# Patient Record
Sex: FEMALE | Race: WHITE | NOT HISPANIC OR LATINO | URBAN - METROPOLITAN AREA
[De-identification: names, ages, dates, MRNs, and addresses within clinical notes are randomized per-mention and may not be internally consistent; named-entity substitution may affect disease eponyms.]

---

## 2017-08-28 PROBLEM — 398050005 DIVERTICULAR DISEASE OF COLON: Status: ACTIVE | Noted: 2017-08-28

## 2017-08-28 PROBLEM — 87522002 IRON DEFICIENCY ANEMIA: Status: ACTIVE | Noted: 2017-08-28

## 2020-10-05 ENCOUNTER — APPOINTMENT (RX ONLY)
Dept: URBAN - METROPOLITAN AREA CLINIC 12 | Facility: CLINIC | Age: 55
Setting detail: DERMATOLOGY
End: 2020-10-05

## 2020-10-05 DIAGNOSIS — Z44.3 ENCOUNTER FOR FITTING AND ADJUSTMENT OF EXTERNAL BREAST PROSTHESIS: ICD-10-CM

## 2020-10-05 DIAGNOSIS — Z41.1 ENCOUNTER FOR COSMETIC SURGERY: ICD-10-CM

## 2020-10-05 PROBLEM — Z44.32: Status: ACTIVE | Noted: 2020-10-05

## 2020-10-05 PROCEDURE — ? COSMETIC CONSULTATION - MICRO-NEEDLING

## 2020-10-05 PROCEDURE — ? PATIENT SPECIFIC COUNSELING

## 2020-10-05 PROCEDURE — ? CONSULTATION - BREAST (COSMETIC)

## 2020-10-05 ASSESSMENT — LOCATION SIMPLE DESCRIPTION DERM
LOCATION SIMPLE: LEFT BREAST
LOCATION SIMPLE: RIGHT ANTERIOR NECK
LOCATION SIMPLE: RIGHT ANTERIOR NECK

## 2020-10-05 ASSESSMENT — LOCATION ZONE DERM
LOCATION ZONE: NECK
LOCATION ZONE: TRUNK
LOCATION ZONE: NECK

## 2020-10-05 ASSESSMENT — LOCATION DETAILED DESCRIPTION DERM
LOCATION DETAILED: LEFT MEDIAL BREAST 6-7:00 REGION
LOCATION DETAILED: RIGHT SUPERIOR ANTERIOR NECK
LOCATION DETAILED: RIGHT SUPERIOR ANTERIOR NECK

## 2020-10-05 NOTE — PROCEDURE: CONSULTATION - BREAST (COSMETIC)
Consultation Charge $ (Use Numbers Only, No Text Please.): 125.46
Detail Level: Detailed
Send Procedure Quote As Charge: No

## 2020-10-05 NOTE — HPI: BREAST (IMPLANT RUPTURE)
Which Breast(S) Are You Concerned About?: left breast
Which Side(S)?: the left breast
How Severe Are Your Concerns?: moderate
Is This A New Presentation, Or A Follow-Up?: Breast Deformity

## 2020-11-02 ENCOUNTER — APPOINTMENT (RX ONLY)
Dept: URBAN - METROPOLITAN AREA CLINIC 12 | Facility: CLINIC | Age: 55
Setting detail: DERMATOLOGY
End: 2020-11-02

## 2020-11-02 DIAGNOSIS — Z44.3 ENCOUNTER FOR FITTING AND ADJUSTMENT OF EXTERNAL BREAST PROSTHESIS: ICD-10-CM

## 2020-11-02 PROBLEM — Z44.30 ENCOUNTER FOR FITTING AND ADJUSTMENT OF EXTERNAL BREAST PROSTHESIS, UNSPECIFIED BREAST: Status: ACTIVE | Noted: 2020-11-02

## 2020-11-02 PROCEDURE — 99024 POSTOP FOLLOW-UP VISIT: CPT

## 2020-11-02 PROCEDURE — ? PRE-OP WORKLIST

## 2020-11-02 PROCEDURE — ? PATIENT SPECIFIC COUNSELING

## 2020-11-02 PROCEDURE — ? NO CHARGE PRE-OP VISIT

## 2020-11-02 ASSESSMENT — LOCATION DETAILED DESCRIPTION DERM
LOCATION DETAILED: RIGHT PERIAREOLAR BREAST 6-7:00 REGION
LOCATION DETAILED: RIGHT MEDIAL BREAST 5-6:00 REGION
LOCATION DETAILED: LEFT PERIAREOLAR BREAST 6-7:00 REGION
LOCATION DETAILED: LEFT MEDIAL BREAST 7-8:00 REGION

## 2020-11-02 ASSESSMENT — LOCATION ZONE DERM: LOCATION ZONE: TRUNK

## 2020-11-02 ASSESSMENT — LOCATION SIMPLE DESCRIPTION DERM
LOCATION SIMPLE: LEFT BREAST
LOCATION SIMPLE: RIGHT BREAST

## 2020-11-02 NOTE — PROCEDURE: PRE-OP WORKLIST
Surgeon: Dr. Harrell
Photos Taken?: yes
Surgery Scheduled: Bilateral implant exchange with vertical lift
Detail Level: Simple
Date Of Surgery: 11/18/2020

## 2020-11-02 NOTE — PROCEDURE: NO CHARGE PRE-OP VISIT
For Tracking Purposes Only. Note 00252 Is Retired Code. Use 89530 Or 39192?: [id number]
Detail Level: Detailed

## 2020-12-23 ENCOUNTER — APPOINTMENT (RX ONLY)
Dept: URBAN - METROPOLITAN AREA CLINIC 12 | Facility: CLINIC | Age: 55
Setting detail: DERMATOLOGY
End: 2020-12-23

## 2020-12-23 DIAGNOSIS — Z41.9 ENCOUNTER FOR PROCEDURE FOR PURPOSES OTHER THAN REMEDYING HEALTH STATE, UNSPECIFIED: ICD-10-CM

## 2020-12-23 DIAGNOSIS — Z48.89 ENCOUNTER FOR OTHER SPECIFIED SURGICAL AFTERCARE: ICD-10-CM

## 2020-12-23 PROCEDURE — ? RESTYLANE REFYNE INJECTION

## 2020-12-23 PROCEDURE — ? COUNSELING - POST-OP CHECK, BREAST IMPLANT EXCHANGE

## 2020-12-23 PROCEDURE — ? PATIENT SPECIFIC COUNSELING

## 2020-12-23 PROCEDURE — ? POST-OP CHECK

## 2020-12-23 PROCEDURE — 99024 POSTOP FOLLOW-UP VISIT: CPT

## 2020-12-23 ASSESSMENT — LOCATION DETAILED DESCRIPTION DERM
LOCATION DETAILED: RIGHT PERIAREOLAR BREAST 5-6:00 REGION
LOCATION DETAILED: LEFT MEDIAL BREAST 7-8:00 REGION
LOCATION DETAILED: RIGHT MEDIAL BREAST 5-6:00 REGION
LOCATION DETAILED: LEFT PERIAREOLAR BREAST 7-8:00 REGION

## 2020-12-23 ASSESSMENT — LOCATION SIMPLE DESCRIPTION DERM
LOCATION SIMPLE: LEFT BREAST
LOCATION SIMPLE: RIGHT BREAST
LOCATION SIMPLE: LEFT BREAST
LOCATION SIMPLE: RIGHT BREAST

## 2020-12-23 ASSESSMENT — LOCATION ZONE DERM
LOCATION ZONE: TRUNK
LOCATION ZONE: TRUNK

## 2020-12-23 NOTE — PROCEDURE: POST-OP CHECK
Detail Level: Detailed
Add Postop Global No-Charge Code (96249)?: yes
Wound Evaluated By: Dr. Harrell

## 2020-12-23 NOTE — PROCEDURE: PATIENT SPECIFIC COUNSELING
Detail Level: Simple
Other (Free Text): Patient present today for post op check implant exchange. Patient reports some itching on incision site and some tenderness of breasts. Patient also present today for lip filler. Dr. Harrell examined patient and reports patient looks great, noted bruising and edema. Advised patient to continue wearing compression garment, when patient is able to reach over her head without pain then she may transition into a sports bra, wait 1 week until patient being normal activities or exercising, glue will fall off in 1 week. Patient verbalized understanding and advised to follow up in 1 week for suture removal.

## 2020-12-23 NOTE — PROCEDURE: RESTYLANE REFYNE INJECTION
[place] Filler Volume In Cc: 0
Number Of Syringes (Required For Inventory): 1
Additional Area 1 Volume In Cc: 0.5
Post-Care Instructions: Patient instructed to apply ice to reduce swelling.
Lot #: [id number]
Map Statement: See Attach Map for Complete Details
Price (Use Numbers Only, No Special Characters Or $): 702
Filler: Restylane Refyne
Anesthesia Type: 1% lidocaine with epinephrine
Use Map Statement For Sites (Optional): No
Procedural Text: The filler was administered to the treatment areas noted above.
Additional Anesthesia Volume In Cc: 6
Expiration Date (Month Year): 11/30/21
Consent: Written consent obtained. Risks include but not limited to bruising, beading, irregular texture, ulceration, infection, allergic reaction, scar formation, incomplete augmentation, temporary nature, procedural pain.
Detail Level: Detailed
Additional Area 1 Location: perioral

## 2021-01-06 ENCOUNTER — APPOINTMENT (RX ONLY)
Dept: URBAN - METROPOLITAN AREA CLINIC 12 | Facility: CLINIC | Age: 56
Setting detail: DERMATOLOGY
End: 2021-01-06

## 2021-01-06 DIAGNOSIS — Z48.89 ENCOUNTER FOR OTHER SPECIFIED SURGICAL AFTERCARE: ICD-10-CM

## 2021-01-06 DIAGNOSIS — Z428 OTHER AFTERCARE INVOLVING THE USE OF PLASTIC SURGERY: ICD-10-CM

## 2021-01-06 PROCEDURE — ? COUNSELING - POST-OP CHECK, BREAST IMPLANT EXCHANGE

## 2021-01-06 PROCEDURE — ? POST-OP CHECK

## 2021-01-06 PROCEDURE — ? ADDITIONAL NOTES

## 2021-01-06 PROCEDURE — 99024 POSTOP FOLLOW-UP VISIT: CPT

## 2021-01-06 PROCEDURE — ? PATIENT SPECIFIC COUNSELING

## 2021-01-06 ASSESSMENT — LOCATION ZONE DERM
LOCATION ZONE: TRUNK
LOCATION ZONE: TRUNK

## 2021-01-06 ASSESSMENT — LOCATION DETAILED DESCRIPTION DERM
LOCATION DETAILED: RIGHT MEDIAL BREAST 5-6:00 REGION
LOCATION DETAILED: LEFT PERIAREOLAR BREAST 7-8:00 REGION
LOCATION DETAILED: RIGHT PERIAREOLAR BREAST 5-6:00 REGION
LOCATION DETAILED: LEFT MEDIAL BREAST 7-8:00 REGION

## 2021-01-06 ASSESSMENT — LOCATION SIMPLE DESCRIPTION DERM
LOCATION SIMPLE: RIGHT BREAST
LOCATION SIMPLE: RIGHT BREAST
LOCATION SIMPLE: LEFT BREAST
LOCATION SIMPLE: LEFT BREAST

## 2021-01-06 NOTE — PROCEDURE: POST-OP CHECK
Detail Level: Detailed
Add Postop Global No-Charge Code (77871)?: yes
Wound Evaluated By: Dr. Harrell

## 2021-01-06 NOTE — PROCEDURE: PATIENT SPECIFIC COUNSELING
Detail Level: Simple
Other (Free Text): Patient present today for post op check implant exchange performed on 11/18/2020. Patient reports some occasionally tenderness of breasts and area where she had liposuction. Dr. Harrell examined patient and reports patient looks great,  Normal for nerve pain and soreness which will diminish overtime, start scar fading tape, may begin horseback riding activities and start massaging breast to help prevent scar tissue. Dr. Harrell removed a dissolvable stitch and removed left over glue on skin. Patient verbalized understanding and photos were obtained. Dr. Harrell consulted and recommended patient to consider Morpheus 8 for tightening lower face and neck or NeckLift. Khloe will email patient a quote. Patient was advised to follow up in 6-8 weeks for Postop.

## 2021-01-06 NOTE — PROCEDURE: ADDITIONAL NOTES
Additional Notes: Pt advised to massage the Refyne in the upper lateral cutaneous lip to help soften. Will reevaluate in three weeks. Pt was happy with treatment but would like more volume. Will recommend Restylane L half syringe and will give discount of $50 for that syringe.
Render Risk Assessment In Note?: no

## 2021-01-15 ENCOUNTER — APPOINTMENT (RX ONLY)
Dept: URBAN - METROPOLITAN AREA CLINIC 12 | Facility: CLINIC | Age: 56
Setting detail: DERMATOLOGY
End: 2021-01-15

## 2021-01-15 DIAGNOSIS — Z41.9 ENCOUNTER FOR PROCEDURE FOR PURPOSES OTHER THAN REMEDYING HEALTH STATE, UNSPECIFIED: ICD-10-CM

## 2021-01-15 PROCEDURE — ? OTHER (COSMETIC)

## 2021-01-15 PROCEDURE — ? PATIENT SPECIFIC COUNSELING

## 2021-01-15 ASSESSMENT — LOCATION ZONE DERM: LOCATION ZONE: LIP

## 2021-01-15 ASSESSMENT — LOCATION SIMPLE DESCRIPTION DERM: LOCATION SIMPLE: LEFT LIP

## 2021-01-15 ASSESSMENT — LOCATION DETAILED DESCRIPTION DERM: LOCATION DETAILED: LEFT SUPERIOR VERMILION LIP

## 2021-01-15 NOTE — PROCEDURE: PATIENT SPECIFIC COUNSELING
Other (Free Text): Patient states she had fillers with our nurse injector Nora  and states she’s very unhappy with her results. Dr. Harrell examined patient and reassured there is some improvement based on her before photos, however, explained there is room for improvement. Recommended injecting Botox around the lips to help smooth the wrinkles. Also mentioned vitrase to melt some of the filler if needed. Dr. Harrell massaged the  her upper lip, and advised that she continue massaging when / she continues to see the lumps. Patient mentioned that she would like her bottom lip rousseau, Dr. Harrell states that he’d be happy to treat her today at no charge.
Detail Level: Zone

## 2021-01-15 NOTE — PROCEDURE: OTHER (COSMETIC)
Detail Level: Zone
Sticky Other (Free Text): No charge visit
Price $ (Use Numbers Only, No Text Please.): 0.00
Other (Free Text): Patient was treated with 2.5 units of Botox, and 0.5 of juvederm ultra xc

## 2021-03-16 ENCOUNTER — APPOINTMENT (RX ONLY)
Dept: URBAN - METROPOLITAN AREA CLINIC 12 | Facility: CLINIC | Age: 56
Setting detail: DERMATOLOGY
End: 2021-03-16

## 2021-03-16 DIAGNOSIS — Z41.1 ENCOUNTER FOR COSMETIC SURGERY: ICD-10-CM

## 2021-03-16 PROCEDURE — ? PRE-OP WORKLIST

## 2021-03-16 PROCEDURE — ? OTHER (COSMETIC)

## 2021-03-16 ASSESSMENT — LOCATION DETAILED DESCRIPTION DERM: LOCATION DETAILED: LEFT INFERIOR ANTERIOR NECK

## 2021-03-16 ASSESSMENT — LOCATION SIMPLE DESCRIPTION DERM: LOCATION SIMPLE: LEFT ANTERIOR NECK

## 2021-03-16 ASSESSMENT — LOCATION ZONE DERM: LOCATION ZONE: NECK

## 2021-03-16 NOTE — PROCEDURE: PRE-OP WORKLIST
Surgery Scheduled: Jo
Date Of Surgery: 3/25/2021
Detail Level: Simple
Surgeon: Dr. Harrell
Photos Taken?: yes

## 2021-03-16 NOTE — HPI: PREOPERATIVE APPOINTMENT
Has The Patient Completed Informed Consent?: is scheduled to complete informed consent documentation during this visit
Has The Patient Fulfilled Financial Responsibilities For Surgical Scheduling?: is scheduled to complete payment to fulfill financial responsibilities during this visit
Have Arrangements And Instructions Been Made For Patient Transportation?: transportation arrangements will be made
Date Of Procedure?: 03/25/2021

## 2021-03-16 NOTE — PROCEDURE: OTHER (COSMETIC)
Detail Level: Zone
Other (Free Text): Preop Appointment
Price $ (Use Numbers Only, No Text Please.): 0.00
Sticky Other (Free Text): Per Dr. Harrell

## 2021-03-22 ENCOUNTER — APPOINTMENT (RX ONLY)
Dept: URBAN - METROPOLITAN AREA CLINIC 12 | Facility: CLINIC | Age: 56
Setting detail: DERMATOLOGY
End: 2021-03-22

## 2021-03-22 DIAGNOSIS — Z41.1 ENCOUNTER FOR COSMETIC SURGERY: ICD-10-CM

## 2021-03-22 PROCEDURE — ? PATIENT SPECIFIC COUNSELING

## 2021-03-22 PROCEDURE — ? PRE-OP WORKLIST

## 2021-03-22 PROCEDURE — ? OTHER (COSMETIC)

## 2021-03-22 ASSESSMENT — LOCATION SIMPLE DESCRIPTION DERM: LOCATION SIMPLE: LEFT ANTERIOR NECK

## 2021-03-22 ASSESSMENT — LOCATION DETAILED DESCRIPTION DERM: LOCATION DETAILED: LEFT INFERIOR ANTERIOR NECK

## 2021-03-22 ASSESSMENT — LOCATION ZONE DERM: LOCATION ZONE: NECK

## 2021-03-22 NOTE — PROCEDURE: PATIENT SPECIFIC COUNSELING
Other (Free Text): Patient presents with additional questions regarding her facelift surgery. Patient has questions about the scar, and how much the lower face lift will take care of her problem areas. Dr. Harrell addressed all of her concerns, recommended going more aggressive with her face lift. Patient agreed, Khloe stepped in to discuss the additional time.
Detail Level: Simple

## 2021-03-31 ENCOUNTER — APPOINTMENT (RX ONLY)
Dept: URBAN - METROPOLITAN AREA CLINIC 12 | Facility: CLINIC | Age: 56
Setting detail: DERMATOLOGY
End: 2021-03-31

## 2021-03-31 DIAGNOSIS — Z48.89 ENCOUNTER FOR OTHER SPECIFIED SURGICAL AFTERCARE: ICD-10-CM

## 2021-03-31 PROCEDURE — 99024 POSTOP FOLLOW-UP VISIT: CPT

## 2021-03-31 PROCEDURE — ? PATIENT SPECIFIC COUNSELING

## 2021-03-31 PROCEDURE — ? COUNSELING - POST-OP CHECK, NECKLIFT

## 2021-03-31 PROCEDURE — ? SUTURE REMOVAL

## 2021-03-31 ASSESSMENT — LOCATION ZONE DERM
LOCATION ZONE: NECK
LOCATION ZONE: FACE
LOCATION ZONE: NECK

## 2021-03-31 ASSESSMENT — LOCATION DETAILED DESCRIPTION DERM
LOCATION DETAILED: SUBMENTAL CHIN
LOCATION DETAILED: RIGHT SUPERIOR ANTERIOR NECK
LOCATION DETAILED: RIGHT SUPERIOR ANTERIOR NECK

## 2021-03-31 ASSESSMENT — LOCATION SIMPLE DESCRIPTION DERM
LOCATION SIMPLE: RIGHT ANTERIOR NECK
LOCATION SIMPLE: SUBMENTAL CHIN
LOCATION SIMPLE: RIGHT ANTERIOR NECK

## 2021-03-31 NOTE — PROCEDURE: PATIENT SPECIFIC COUNSELING
Other (Free Text): Patient presents today for post op check from NeckLift surgery performed on 3/25/21. Patient states the chin strap keeps her comfortable and without the chin strap she has discomfort. Patient reports she threw up after taking the doxycycline. Dr. Harrell examined patient and reports patient is healing well, skin is healthy, no signs of infection, normal swelling and bruising. Dr. Harrell removed submental chin sutures today. Advised patient to stop taking the antibiotic, stay hydrated and eat nutrient rich meals. Patient verbalized understanding and advised to follow up next week for suture and stable removal behind both ears.
Detail Level: Simple

## 2021-04-08 ENCOUNTER — APPOINTMENT (RX ONLY)
Dept: URBAN - METROPOLITAN AREA CLINIC 12 | Facility: CLINIC | Age: 56
Setting detail: DERMATOLOGY
End: 2021-04-08

## 2021-04-08 DIAGNOSIS — Z48.02 ENCOUNTER FOR REMOVAL OF SUTURES: ICD-10-CM

## 2021-04-08 PROCEDURE — ? PATIENT SPECIFIC COUNSELING

## 2021-04-08 PROCEDURE — ? STAPLE REMOVAL

## 2021-04-08 PROCEDURE — ? COUNSELING - SUTURE REMOVAL

## 2021-04-08 PROCEDURE — ? OTHER (COSMETIC)

## 2021-04-08 ASSESSMENT — LOCATION SIMPLE DESCRIPTION DERM
LOCATION SIMPLE: POSTERIOR SCALP
LOCATION SIMPLE: RIGHT EAR

## 2021-04-08 ASSESSMENT — LOCATION ZONE DERM
LOCATION ZONE: EAR
LOCATION ZONE: SCALP

## 2021-04-08 ASSESSMENT — LOCATION DETAILED DESCRIPTION DERM
LOCATION DETAILED: RIGHT POSTAURICULAR CREASE
LOCATION DETAILED: LEFT INFERIOR POSTAURICULAR SKIN

## 2021-04-08 NOTE — PROCEDURE: PATIENT SPECIFIC COUNSELING
Detail Level: Zone
Other (Free Text): Patient presents for staple removal. Patient reports doing well, staples were removed without complication, patient advised to follow up with Dr. Harrell next week.

## 2021-04-13 ENCOUNTER — APPOINTMENT (RX ONLY)
Dept: URBAN - METROPOLITAN AREA CLINIC 12 | Facility: CLINIC | Age: 56
Setting detail: DERMATOLOGY
End: 2021-04-13

## 2021-04-13 DIAGNOSIS — Z48.89 ENCOUNTER FOR OTHER SPECIFIED SURGICAL AFTERCARE: ICD-10-CM

## 2021-04-13 PROBLEM — Z48.02 ENCOUNTER FOR REMOVAL OF SUTURES: Status: ACTIVE | Noted: 2021-04-13

## 2021-04-13 PROCEDURE — ? PATIENT SPECIFIC COUNSELING

## 2021-04-13 PROCEDURE — ? HYALURONIDASE INJECTION

## 2021-04-13 PROCEDURE — ? COUNSELING - POST-OP CHECK, NECKLIFT

## 2021-04-13 ASSESSMENT — LOCATION SIMPLE DESCRIPTION DERM
LOCATION SIMPLE: RIGHT ANTERIOR NECK
LOCATION SIMPLE: RIGHT LIP
LOCATION SIMPLE: RIGHT ANTERIOR NECK

## 2021-04-13 ASSESSMENT — LOCATION DETAILED DESCRIPTION DERM
LOCATION DETAILED: RIGHT SUPERIOR ANTERIOR NECK
LOCATION DETAILED: RIGHT UPPER CUTANEOUS LIP
LOCATION DETAILED: RIGHT SUPERIOR ANTERIOR NECK

## 2021-04-13 ASSESSMENT — LOCATION ZONE DERM
LOCATION ZONE: LIP
LOCATION ZONE: NECK
LOCATION ZONE: NECK

## 2021-04-13 NOTE — PROCEDURE: HYALURONIDASE INJECTION
Lot # (Optional): [id number]
Expiration Date (Optional): 5/2021
Hyaluronidase Preparation: hyaluronidase 1:1 with saline
Total Volume (Ccs): 0.4
Detail Level: Detailed
Consent: The risks of the procedure including pain, burning, contour defects and dimpling of the skin, and the need for multiple treatments were reviewed with the patient prior to the injection.

## 2021-04-13 NOTE — PROCEDURE: PATIENT SPECIFIC COUNSELING
Other (Free Text): Patient presents today for post op check from NeckLift surgery performed on 3/25/21. Patient reports doing well. Dr. Harrell examined patients face, explained that here is still some swelling along her ear lobes, neck and jawline both look great. Dr. Harrell snipped a few sutures today. Patient is cleared to resume normal activity, including horseback riding and jumping.  Patient will follow up in 1 week to check lips \\n\\nPatient was treated with vitrase to help melt  in her lips.
Detail Level: Simple

## 2021-04-27 ENCOUNTER — APPOINTMENT (RX ONLY)
Dept: URBAN - METROPOLITAN AREA CLINIC 12 | Facility: CLINIC | Age: 56
Setting detail: DERMATOLOGY
End: 2021-04-27

## 2021-04-27 DIAGNOSIS — Z48.89 ENCOUNTER FOR OTHER SPECIFIED SURGICAL AFTERCARE: ICD-10-CM

## 2021-04-27 PROCEDURE — ? PRESCRIPTION

## 2021-04-27 PROCEDURE — ? RESTYLANE INJECTION

## 2021-04-27 PROCEDURE — ? COUNSELING - POST-OP CHECK, NECKLIFT

## 2021-04-27 PROCEDURE — ? COUNSELING - POST-OP CHECK, BREAST IMPLANT EXCHANGE

## 2021-04-27 PROCEDURE — ? PATIENT SPECIFIC COUNSELING

## 2021-04-27 RX ORDER — MONTELUKAST SODIUM 10 MG/1
TABLET, FILM COATED ORAL
Qty: 30 | Refills: 1 | Status: ERX | COMMUNITY
Start: 2021-04-27

## 2021-04-27 RX ADMIN — MONTELUKAST SODIUM: 10 TABLET, FILM COATED ORAL at 00:00

## 2021-04-27 ASSESSMENT — LOCATION SIMPLE DESCRIPTION DERM
LOCATION SIMPLE: RIGHT ANTERIOR NECK
LOCATION SIMPLE: RIGHT ANTERIOR NECK

## 2021-04-27 ASSESSMENT — LOCATION ZONE DERM
LOCATION ZONE: NECK
LOCATION ZONE: NECK

## 2021-04-27 ASSESSMENT — LOCATION DETAILED DESCRIPTION DERM
LOCATION DETAILED: RIGHT SUPERIOR ANTERIOR NECK
LOCATION DETAILED: RIGHT SUPERIOR ANTERIOR NECK

## 2021-04-27 NOTE — PROCEDURE: PATIENT SPECIFIC COUNSELING
Other (Free Text): Patient presents today for post op check from NeckLift (3/25/21), Bilateral implant exchange with vertical lift (11/18/20) and follow up on Lip Filler. Patient voiced her right implant has dropped slightly, patient states it’s not a big deal, patient reports she has to start getting mammograms very 6 months, found an abnormal lump under breast.\\n\\Alis Harrell examined patient and Explained the scar tissue around implant has settled in and is more matured at this point and the left implant possibly is allison upwards. Recommended patient to continue massaging breast every day and prescribed Singulair for contracture. Dr. Harrell suggested to add filler to upper lip to bring the pink part of lip out. patient verbalized understanding and consent was signed. Dr. Harrell injected 0.5cc of Restylane today. Patient advised to follow up in 2 months.
Detail Level: Simple

## 2021-04-27 NOTE — PROCEDURE: RESTYLANE INJECTION
Mid Face Filler Volume In Cc: 0
Expiration Date (Month Year): 03/2023
Price (Use Numbers Only, No Special Characters Or $): 395
Filler: Restylane-L
[place] Lips Filler Volume In Cc: 0.5
Lot #: [id number]
Use Map Statement For Sites (Optional): No
Procedural Text: The filler was administered to the treatment areas noted above.
Consent: Written consent obtained. Risks include but not limited to bruising, beading, irregular texture, ulceration, infection, allergic reaction, scar formation, incomplete augmentation, temporary nature, procedural pain.
Topical Anesthesia?: BLT cream (benzocaine 20%, lidocaine 6%, tetracaine 4%)
Detail Level: Detailed
Map Statement: See Attach Map for Complete Details
Post-Care Instructions: Patient instructed to apply ice to reduce swelling.

## 2022-01-14 ENCOUNTER — APPOINTMENT (RX ONLY)
Dept: URBAN - METROPOLITAN AREA CLINIC 12 | Facility: CLINIC | Age: 57
Setting detail: DERMATOLOGY
End: 2022-01-14

## 2022-01-14 DIAGNOSIS — Z48.89 ENCOUNTER FOR OTHER SPECIFIED SURGICAL AFTERCARE: ICD-10-CM

## 2022-01-14 PROCEDURE — ? PRESCRIPTION

## 2022-01-14 PROCEDURE — ? COUNSELING - POST-OP CHECK, NECKLIFT

## 2022-01-14 PROCEDURE — ? COUNSELING - POST-OP CHECK, BREAST IMPLANT EXCHANGE

## 2022-01-14 PROCEDURE — ? PATIENT SPECIFIC COUNSELING

## 2022-01-14 PROCEDURE — 99024 POSTOP FOLLOW-UP VISIT: CPT

## 2022-01-14 RX ORDER — MONTELUKAST SODIUM 10 MG/1
TABLET, FILM COATED ORAL
Qty: 30 | Refills: 3 | Status: ERX | COMMUNITY
Start: 2022-01-14

## 2022-01-14 RX ADMIN — MONTELUKAST SODIUM: 10 TABLET, FILM COATED ORAL at 00:00

## 2022-01-14 ASSESSMENT — LOCATION ZONE DERM
LOCATION ZONE: NECK
LOCATION ZONE: NECK

## 2022-01-14 ASSESSMENT — LOCATION SIMPLE DESCRIPTION DERM
LOCATION SIMPLE: RIGHT ANTERIOR NECK
LOCATION SIMPLE: RIGHT ANTERIOR NECK

## 2022-01-14 ASSESSMENT — LOCATION DETAILED DESCRIPTION DERM
LOCATION DETAILED: RIGHT SUPERIOR ANTERIOR NECK
LOCATION DETAILED: RIGHT SUPERIOR ANTERIOR NECK

## 2022-01-14 NOTE — PROCEDURE: PATIENT SPECIFIC COUNSELING
Other (Free Text): Patient presents today for post op check from NeckLift (3/25/21), Bilateral implant exchange with vertical lift (11/18/20) and follow up on Lip Filler.Patient reports that her breasts are very tender and painful to touch, especially the right one. Patient said she’s very happy with the her neck. Patient is unhappy with how her ear lobes were unattached. \\Alis Harrell examined patient and explained that he can shorten her earlobes and reattach them, because right now they’re too long, and with the neck lift they’re more apparent; this can be done in the office at no charge. Explained that the patient is most likely experiencing capsular contraction, and it it’s interfering with her daily life, the patient will have to go back to the OR to have the scar tissue removed and put a new implant in. Informed that it’s possible there’s a bacteria biofilm that’s causing the scar tissue and pain, however it’s not an infection. \\nAnother option would be to add a collagen sheet with the new implant, is supposed to help prevent capsular contraction however it’s expensive. Informed though that since the patient has already experienced capsular contraction, she is at increased risk to go through it again. \\nOr the patient can just have the implants removed entirely to stop capsular contraction, and a breast lift would be done at the same time. \\nPatient verbalized understanding and Khloe stepped in to provide a quote.
Detail Level: Simple

## 2022-02-25 ENCOUNTER — APPOINTMENT (RX ONLY)
Dept: URBAN - METROPOLITAN AREA CLINIC 12 | Facility: CLINIC | Age: 57
Setting detail: DERMATOLOGY
End: 2022-02-25

## 2022-02-25 DIAGNOSIS — H61.11 ACQUIRED DEFORMITY OF PINNA: ICD-10-CM

## 2022-02-25 PROBLEM — H61.113 ACQUIRED DEFORMITY OF PINNA, BILATERAL: Status: ACTIVE | Noted: 2022-02-25

## 2022-02-25 PROCEDURE — ? CONSULTATION - STRETCHED EARLOBE

## 2022-02-25 PROCEDURE — ? OTHER (COSMETIC)

## 2022-02-25 PROCEDURE — ? EAR LOBE REPAIR COSMETIC

## 2022-02-25 ASSESSMENT — LOCATION DETAILED DESCRIPTION DERM
LOCATION DETAILED: LEFT ANTERIOR EARLOBE
LOCATION DETAILED: RIGHT ANTERIOR EARLOBE

## 2022-02-25 ASSESSMENT — LOCATION ZONE DERM: LOCATION ZONE: EAR

## 2022-02-25 ASSESSMENT — LOCATION SIMPLE DESCRIPTION DERM
LOCATION SIMPLE: LEFT EAR
LOCATION SIMPLE: RIGHT EAR

## 2022-02-25 NOTE — PROCEDURE: EAR LOBE REPAIR COSMETIC
Epidermal Sutures: 5-0 Prolene
Hemostasis: electrocautery
Repair, Right Ear: Repair with Preservation of Piercehole
Epidermal Closure: simple interrupted
Repair With Preservation Of Piercehole Text: The torn portion of the earlobe was excised completely with a #15 scalpel blade to create fresh edges. The existing piercehole was maintained. The edges were undermined slightly to allow them to splay for proper closure.
Anesthesia Volume In Cc: 9
Suture Removal: 14 days
Repair With Preservation Of Piercehole And Stent Text: The torn portion of the earlobe was excised completely with a #15 scalpel blade to create fresh edges. The edges were undermined slightly to allow them to splay for closure. A stent was created to create a new piercehole at the time of repair and placed in appropriate position.
Estimated Blood Loss (Cc): minimal
Repair Without Preservation Of Piercehole Text: The torn portion of the earlobe was excised completely with a #15 scalpel blade to create fresh edges. The edges were undermined slightly to allow them to splay for proper closure.
Detail Level: Detailed
Price $ (Use Numbers Only, No Text Please.): 0.00
Consent: The benefits, alternatives, risks, and complications of earlobe repair were discussed including, but not limited to, the risks of pain, infection, bleeding, persistent or worse cosmetic defect, inability to hold earrings, requirement for additional piercing, asymmetry, and need for further repair, among others.
Anesthesia Type: 1% lidocaine with epinephrine
Positioning: The patient was placed in the standard supine position in the usual manner.
Dermal Sutures: 5-0 Monocryl
Surgeon: Dr. Harrell

## 2022-03-07 ENCOUNTER — APPOINTMENT (RX ONLY)
Dept: URBAN - METROPOLITAN AREA CLINIC 12 | Facility: CLINIC | Age: 57
Setting detail: DERMATOLOGY
End: 2022-03-07

## 2022-03-07 DIAGNOSIS — Z48.89 ENCOUNTER FOR OTHER SPECIFIED SURGICAL AFTERCARE: ICD-10-CM

## 2022-03-07 PROCEDURE — ? PATIENT SPECIFIC COUNSELING

## 2022-03-07 PROCEDURE — 99024 POSTOP FOLLOW-UP VISIT: CPT

## 2022-03-07 PROCEDURE — ? COUNSELING - POST-OP CHECK, OTOPLASTY

## 2022-03-07 PROCEDURE — ? OTHER (COSMETIC)

## 2022-03-07 PROCEDURE — ? POST-OP CHECK

## 2022-03-07 ASSESSMENT — LOCATION ZONE DERM
LOCATION ZONE: EAR
LOCATION ZONE: EAR

## 2022-03-07 ASSESSMENT — LOCATION SIMPLE DESCRIPTION DERM
LOCATION SIMPLE: RIGHT EAR
LOCATION SIMPLE: RIGHT EAR
LOCATION SIMPLE: LEFT EAR
LOCATION SIMPLE: LEFT EAR

## 2022-03-07 ASSESSMENT — LOCATION DETAILED DESCRIPTION DERM
LOCATION DETAILED: LEFT ANTERIOR EARLOBE
LOCATION DETAILED: RIGHT ANTERIOR EARLOBE
LOCATION DETAILED: RIGHT ANTERIOR EARLOBE
LOCATION DETAILED: LEFT ANTERIOR EARLOBE

## 2022-03-07 NOTE — PROCEDURE: OTHER (COSMETIC)
Price $ (Use Numbers Only, No Text Please.): 0.00
Detail Level: Zone
Sticky Other (Free Text): No charge post op check

## 2022-03-07 NOTE — PROCEDURE: POST-OP CHECK
Detail Level: Detailed
Add Postop Global No-Charge Code (63918)?: yes
Wound Evaluated By: Dr. Harrell

## 2022-03-07 NOTE — PROCEDURE: PATIENT SPECIFIC COUNSELING
Other (Free Text): Patient presents s/p ear lobe repair performed 2/25/22 patient reports doing well, no concerns other than soreness. \\n\\nDr.Julio Cesar evaluated patient, voiced patients ears looks great, healing very well, sutures are ready for removal and removed today without complication. Advised to follow up in 6 weeks  to check breast  .
Detail Level: Zone

## 2022-11-28 PROBLEM — 428283002 HISTORY OF POLYP OF COLON: Status: ACTIVE | Noted: 2022-11-28

## 2022-12-09 ENCOUNTER — CLAIMS CREATED FROM THE CLAIM WINDOW (OUTPATIENT)
Dept: URBAN - METROPOLITAN AREA SURGERY CENTER 8 | Facility: SURGERY CENTER | Age: 57
End: 2022-12-09
Payer: COMMERCIAL

## 2022-12-09 ENCOUNTER — OFFICE VISIT (OUTPATIENT)
Dept: URBAN - METROPOLITAN AREA SURGERY CENTER 8 | Facility: SURGERY CENTER | Age: 57
End: 2022-12-09

## 2022-12-09 DIAGNOSIS — K63.89 BACTERIAL OVERGROWTH SYNDROME: ICD-10-CM

## 2022-12-09 DIAGNOSIS — R19.7 ACUTE DIARRHEA: ICD-10-CM

## 2022-12-09 PROCEDURE — 45380 COLONOSCOPY AND BIOPSY: CPT | Performed by: INTERNAL MEDICINE

## 2022-12-09 PROCEDURE — G8907 PT DOC NO EVENTS ON DISCHARG: HCPCS | Performed by: INTERNAL MEDICINE

## 2024-04-03 ENCOUNTER — OV EP (OUTPATIENT)
Dept: URBAN - METROPOLITAN AREA CLINIC 35 | Facility: CLINIC | Age: 59
End: 2024-04-03
Payer: COMMERCIAL

## 2024-04-03 VITALS
HEIGHT: 63 IN | BODY MASS INDEX: 23.74 KG/M2 | OXYGEN SATURATION: 94 % | SYSTOLIC BLOOD PRESSURE: 100 MMHG | HEART RATE: 64 BPM | WEIGHT: 134 LBS | DIASTOLIC BLOOD PRESSURE: 70 MMHG

## 2024-04-03 DIAGNOSIS — Z86.010 HX OF ADENOMATOUS POLYP OF COLON: ICD-10-CM

## 2024-04-03 DIAGNOSIS — R19.4 BOWEL HABIT CHANGES: ICD-10-CM

## 2024-04-03 DIAGNOSIS — R15.9 FULL INCONTINENCE OF FECES: ICD-10-CM

## 2024-04-03 DIAGNOSIS — R74.8 ELEVATED LIVER ENZYMES: ICD-10-CM

## 2024-04-03 DIAGNOSIS — Z80.0 FAMILY HX OF COLON CANCER: ICD-10-CM

## 2024-04-03 PROBLEM — 1086911000119107: Status: ACTIVE | Noted: 2024-04-03

## 2024-04-03 PROBLEM — 429047008: Status: ACTIVE | Noted: 2024-04-03

## 2024-04-03 PROCEDURE — 99204 OFFICE O/P NEW MOD 45 MIN: CPT | Performed by: STUDENT IN AN ORGANIZED HEALTH CARE EDUCATION/TRAINING PROGRAM

## 2024-04-03 RX ORDER — SERTRALINE HYDROCHLORIDE 100 MG/1
1 TABLET TABLET, FILM COATED ORAL ONCE A DAY
Status: ACTIVE | COMMUNITY

## 2024-04-03 RX ORDER — PNV NO.95/FERROUS FUM/FOLIC AC 28MG-0.8MG
TABLET ORAL
Status: ON HOLD | COMMUNITY

## 2024-04-03 RX ORDER — FERROUS SULFATE 325(65) MG
1 TABLET TABLET ORAL ONCE A DAY
Status: ACTIVE | COMMUNITY

## 2024-04-03 RX ORDER — AMOXICILLIN 500 MG
CAPSULE ORAL
Status: ON HOLD | COMMUNITY

## 2024-04-03 RX ORDER — OMEGA-3/DHA/EPA/FISH OIL 60 MG-90MG
1 CAPSULE CAPSULE ORAL TWICE A DAY
Status: ON HOLD | COMMUNITY

## 2024-04-03 RX ORDER — ACETAMINOPHEN 325 MG
1 CAPSULE TABLET ORAL ONCE A DAY
Status: ACTIVE | COMMUNITY

## 2024-04-03 NOTE — HPI-TODAY'S VISIT:
58 year old female patient presents today for C/O change in bowel habits/diarrhea with urgencies she reports multiple episodes fecal incontinence. Reports 3-4x per day incontinence, tbsp of incontinence. Feels sudden urgency but sometimes doesn't feel urgency and has incontinence.  Sometimes seeps into clothes. Active with tennis, horse riding and makes embarrasing. She has BM watery 1-2 times per week. Has days without actual BM. Tries to have BM and can't. Strains to have BM but difficulty. Has BM generally 2x per week. No bleeding. Embarrassing for her with incontinence. Reports chronically for months/years (prior evaluation 2022). Takes imodium AD once per week. She has tried metamucil a year and didn't seem to help, 2 tbsp daily at night. Cutting sugar out of diet, avoiding fried food. Decreased dairy consumption. Has 3 kids, vaginal delivers, denies trauma.Does kegel exercises. If knows not going anywhere for next day, uses laxatives and helps in morning to evacuate.   Hx LFT elevation noted on labs. Reports taking a lot of tylenol/advil around time more elevated for joint pain, stopped both. Not on statin. No hertbal/GNC/OTC supplements. Has not had further evaluation for LFT that aware. Drinks alcohol 3 glasses wine in a week. Mom had CRC (dx in 60s), no family hx liver cancer or liver disease.   No previous history of EGD. No imaging or labs completed for symptoms at this time.  Lab 01/26/2024 WBC 6.0; RBC 4.36; HGB 11.2; MCH 25.7(L); MCHC 30.7(L) Creat 0.75; BUN/Creat Ratio 29(H); Nayan, Tot 0.5; Alk, Phos 108; AST 43(H); ALT 47(H)  Lab 01/03/2024 Creat 0.74; Bill, Tot 0.8; Alk, Phos 113; AST 31; ALT 36(H) WBC 6.0; RBC 4.78; HGB 12.3; MCH 25.7(L); MCHC 31.5(L);   Lab 11/16/2023 WBC 5.5; RBC 4.83; HGB 12.5; MCH 25.9(L); MCHC 30.6(L);  Creat 0.92; Nayan, Tot 0.4; Alk, Phos 102; AST 47(H); ALT 52(H) INR 1.0; PT 10.3  Lab 10/26/2023 Nayan, Tot 0.5; Alk, Phos 138(H); AST 56(H); ALT 73(H)  Lab 10/18/2023 Creat 0.87; Nayan, Tot 0.9; Alk, Phos 164(H); AST 96(H); (H)  US ABDOMEN LIMITED 11/13/2023 Normal right upper quadrant ultrasound.  MRI Sacrum/Coccyx w/o contrast 02/15/2022 Mild/moderate bilateral sacroiliac joint osteoarthrosis. No sufficient seefractures. Advanced spondylosis/degenerative disease along the imaged lower lumbar spinewith endplate edema at L4/L5. At this level, a posterior disc protrusion causesat least moderate spinal canal stenosis and contributes to moderate to severelateral neuroforaminal space narrowing. For further evaluation of the lumbarspine, consider MR imaging as clinically indicated/warranted.  Last Colonoscopy 12/09/2022 - Redundant colon.- There was significant looping of the colon.- The examination was otherwise normal on direct and retroflexion views.- The examined portion of the ileum was normal.- Several biopsies were obtained in the sigmoid colon, in the descending colon, in the transverse colonand in the ascending colon  Pathology report: A. Colon, Ascending, Transverse, Descending, Sigmoid, Biopsy: - COLONIC MUCOSA WITH NO SIGNIFICANT HISTOPATHOLOGY.-No evidence of active, chronic, or microscopic colitis

## 2024-04-23 LAB
ALBUMIN/GLOBULIN RATIO: 1.8
ALBUMIN: 4.2
ALKALINE PHOSPHATASE: 85
ALPHA-1-ANTITRYPSIN (AAT) PHENOTYPE: (no result)
ALT (SGPT): 23
AST (SGOT): 25
BILIRUBIN, DIRECT: 0.1
BILIRUBIN, INDIRECT: 0.6
BILIRUBIN, TOTAL: 0.7
FERRITIN, SERUM: 99
GLOBULIN: 2.3
HEPATITIS A AB, TOTAL: (no result)
HEPATITIS B CORE AB TOTAL: (no result)
HEPATITIS B SURFACE AB IMMUNITY, QN: <5
HEPATITIS B SURFACE ANTIGEN: (no result)
HEPATITIS C ANTIBODY: (no result)
IMMUNOGLOBULIN G, QN, SERUM: 1065
IRON BIND.CAP.(TIBC): 295
IRON SATURATION: 39
IRON: 115
MITOCHONDRIAL (M2) ANTIBODY: <=20
PROTEIN, TOTAL: 6.5
REFLEX TIQ: (no result)
SMOOTH MUSCLE AB SCREEN: NEGATIVE
TISSUE TRANSGLUTAMINASE AB, IGA: <1
TISSUE TRANSGLUTAMINASE AB, IGG: 1.1

## 2024-05-01 ENCOUNTER — DASHBOARD ENCOUNTERS (OUTPATIENT)
Age: 59
End: 2024-05-01

## 2024-05-01 ENCOUNTER — OFFICE VISIT (OUTPATIENT)
Dept: URBAN - METROPOLITAN AREA CLINIC 35 | Facility: CLINIC | Age: 59
End: 2024-05-01
Payer: COMMERCIAL

## 2024-05-01 VITALS
OXYGEN SATURATION: 97 % | WEIGHT: 129 LBS | HEIGHT: 63 IN | HEART RATE: 60 BPM | BODY MASS INDEX: 22.86 KG/M2 | SYSTOLIC BLOOD PRESSURE: 100 MMHG | DIASTOLIC BLOOD PRESSURE: 70 MMHG

## 2024-05-01 DIAGNOSIS — R74.8 ELEVATED LIVER ENZYMES: ICD-10-CM

## 2024-05-01 DIAGNOSIS — Z86.010 HX OF ADENOMATOUS POLYP OF COLON: ICD-10-CM

## 2024-05-01 DIAGNOSIS — Z80.0 FAMILY HX OF COLON CANCER: ICD-10-CM

## 2024-05-01 DIAGNOSIS — R19.4 BOWEL HABIT CHANGES: ICD-10-CM

## 2024-05-01 DIAGNOSIS — R15.9 FULL INCONTINENCE OF FECES: ICD-10-CM

## 2024-05-01 PROCEDURE — 99213 OFFICE O/P EST LOW 20 MIN: CPT | Performed by: STUDENT IN AN ORGANIZED HEALTH CARE EDUCATION/TRAINING PROGRAM

## 2024-05-01 RX ORDER — OMEGA-3/DHA/EPA/FISH OIL 60 MG-90MG
1 CAPSULE CAPSULE ORAL TWICE A DAY
Status: ON HOLD | COMMUNITY

## 2024-05-01 RX ORDER — SERTRALINE HYDROCHLORIDE 100 MG/1
1 TABLET TABLET, FILM COATED ORAL ONCE A DAY
Status: ACTIVE | COMMUNITY

## 2024-05-01 RX ORDER — PNV NO.95/FERROUS FUM/FOLIC AC 28MG-0.8MG
TABLET ORAL
Status: ON HOLD | COMMUNITY

## 2024-05-01 RX ORDER — ACETAMINOPHEN 325 MG
1 CAPSULE TABLET ORAL ONCE A DAY
Status: ACTIVE | COMMUNITY

## 2024-05-01 RX ORDER — FERROUS SULFATE 325(65) MG
1 TABLET TABLET ORAL ONCE A DAY
Status: ACTIVE | COMMUNITY

## 2024-05-01 RX ORDER — AMOXICILLIN 500 MG
CAPSULE ORAL
Status: ON HOLD | COMMUNITY

## 2024-05-25 NOTE — PROCEDURE: PATIENT SPECIFIC COUNSELING
Detail Level: Zone
Other (Free Text): ****patient would like to have saline implants again. ****\\n**** photos taken on iPad only per tonny Harrell **** \\n\\nPatient presents with a ruptured breast implant on the left side. Patient reports having breast implant placed 20 years ago, and revised to go under the muscle 7 years ago. Patient states she has saline implants. And would like to replace the implants, and go smaller ( Full B cup) Patient was evaluated and  explained that since she just had surgery on her right shoulder, that she will need to be recovered from that procedure before moving forward with exchanging her implants. However, explained that after her recovery, she will be a great candidate for implant exchange. Discussed doing a breast lift to help raise the nipple slightly. And exchanging the profile of her implant to reduce the width of her breast. Explained that her implants will go back under the muscle. Explained that her down time will be about 2 weeks.  Requested patient to find her previous implant card. \\n\\nPatient consulted on a neck lift. Dr. Harrell evaluated patients neck and explained that she does not need surgery yet, recommended microneedling with Radio frequency. Patient verbalized understanding.
No